# Patient Record
Sex: FEMALE | Race: BLACK OR AFRICAN AMERICAN | Employment: OTHER | URBAN - METROPOLITAN AREA
[De-identification: names, ages, dates, MRNs, and addresses within clinical notes are randomized per-mention and may not be internally consistent; named-entity substitution may affect disease eponyms.]

---

## 2021-03-11 ENCOUNTER — TRANSCRIBE ORDER (OUTPATIENT)
Dept: SCHEDULING | Age: 86
End: 2021-03-11

## 2021-03-11 DIAGNOSIS — R09.89 LEFT CAROTID BRUIT: Primary | ICD-10-CM

## 2021-03-11 DIAGNOSIS — I42.0 CONGESTIVE CARDIOMYOPATHY (HCC): Primary | ICD-10-CM

## 2021-03-18 ENCOUNTER — HOSPITAL ENCOUNTER (OUTPATIENT)
Dept: NON INVASIVE DIAGNOSTICS | Age: 86
Discharge: HOME | End: 2021-03-18
Attending: INTERNAL MEDICINE
Payer: MEDICARE

## 2021-03-18 ENCOUNTER — HOSPITAL ENCOUNTER (OUTPATIENT)
Dept: VASCULAR SURGERY | Age: 86
Discharge: HOME | End: 2021-03-18
Attending: INTERNAL MEDICINE
Payer: MEDICARE

## 2021-03-18 VITALS
DIASTOLIC BLOOD PRESSURE: 72 MMHG | SYSTOLIC BLOOD PRESSURE: 162 MMHG | HEIGHT: 66 IN | WEIGHT: 147 LBS | BODY MASS INDEX: 23.63 KG/M2

## 2021-03-18 DIAGNOSIS — R09.89 LEFT CAROTID BRUIT: ICD-10-CM

## 2021-03-18 DIAGNOSIS — I42.0 CONGESTIVE CARDIOMYOPATHY (HCC): ICD-10-CM

## 2021-03-18 LAB
ECHO AO ROOT DIAM: 2.5 CM
ECHO AV AREA PEAK VELOCITY: 2.73 CM2
ECHO AV AREA PLAN/BSA: 1.32
ECHO AV AREA VTI: 2.31 CM2
ECHO AV AREA/BSA PEAK VELOCITY: 1.6 CM2/M2
ECHO AV AREA/BSA VTI: 1.3 CM2/M2
ECHO AV CUSP MM: 1.4 CM
ECHO AV MEAN GRADIENT: 5 MMHG
ECHO AV MEAN VELOCITY: 106 CM/S
ECHO AV PEAK GRADIENT: 8 MMHG
ECHO AV VTI: 33.5 CM
ECHO EST RA PRESSURE: 3 MMHG
ECHO LA AREA 2C: 11.8 CM2
ECHO LA AREA 4C: 13 CM2
ECHO LA MAJOR AXIS: 3.5 CM
ECHO LA MAJOR AXIS: 4.48 CM
ECHO LA TO AORTIC ROOT RATIO: 1.4
ECHO LV E' LATERAL VELOCITY: 6.42 CM/S
ECHO LV E' SEPTAL VELOCITY: 5.98 CM/S
ECHO LV EDV A2C: 74.1 CM3
ECHO LV EJECTION FRACTION A2C: 36 %
ECHO LV EJECTION FRACTION BIPLANE: 65.6 % (ref 55–100)
ECHO LV ESV A2C: 19.7 CM3
ECHO LV INTERNAL DIMENSION DIASTOLIC: 4.2 CM (ref 3.9–5.3)
ECHO LV INTERNAL DIMENSION SYSTOLIC: 2.7 CM
ECHO LV IVSD: 0.8 CM (ref 0.6–0.9)
ECHO LV MASS 2D: 101.3 G (ref 67–162)
ECHO LV MASS INDEX 2D: 57.7 G/M2 (ref 43–95)
ECHO LV POSTERIOR WALL DIASTOLIC: 0.8 CM (ref 0.6–0.9)
ECHO LVOT DIAM: 2 CM
ECHO LVOT PEAK GRADIENT: 6 MMHG
ECHO LVOT SV: 77 CM3
ECHO LVOT VTI: 21.8 CM
ECHO LVOT VTI: 24.6 CM
ECHO LVOT VTI: 25.8 CM
ECHO LVOT VTI: 26.1 CM
ECHO MV A VELOCITY: 101 CM/S
ECHO MV AREA PHT: 3.1 CM2
ECHO MV E DECELERATION TIME (DT): 243 MS
ECHO MV E VELOCITY: 84.9 CM/S
ECHO MV E/A RATIO: 0.84
ECHO MV E/E' LATERAL: 13.22
ECHO MV E/E' RATIO (AVERAGED): 13.71
ECHO MV E/E' SEPTAL: 14.2
ECHO MV PRESSURE HALF TIME (PHT): 71 MS
ECHO PV REGURGITANT MAX VELOCITY: 121 CM/S
ECHO PV REGURGITANT MAX VELOCITY: 139 CM/S
ECHO PV REGURGITANT MAX VELOCITY: 207 CM/S
ECHO RA AREA 4C: 10.9 CM2
ECHO RA MAJOR AXIS: 4.83 CM
ECHO RIGHT VENTRICULAR SYSTOLIC PRESSURE (RVSP): 20 MMHG
ECHO RV INTERNAL DIMENSION: 2.4 CM
ECHO RV TAPSE: 1.39 CM (ref 1.5–2)
ECHO TV MEAN GRADIENT: 17 MMHG
LA VOL DISK BP: 30.5 CM3 (ref 22–52)
LEFT CCA DIST DIAS: 17.4 CM/S
LEFT CCA DIST SYS: 87 CM/S
LEFT CCA MID DIAS: 16.8 CM/S
LEFT CCA MID SYS: 87 CM/S
LEFT CCA PROX DIAS: 18 CM/S
LEFT CCA PROX SYS: 103 CM/S
LEFT ECA SYS: 68 CM/S
LEFT ICA DIST DIAS: 25.9 CM/S
LEFT ICA DIST SYS: 96.7 CM/S
LEFT ICA MID DIAS: 24.5 CM/S
LEFT ICA MID SYS: 117 CM/S
LEFT ICA PROX DIAS: 21.1 CM/S
LEFT ICA PROX SYS: 74.6 CM/S
LEFT ICA/CCA SYS: 1.34
LEFT SUBCLAVIAN SYS: 103 CM/S
LEFT VERTEBRAL DIAS: 12.8 CM/S
LEFT VERTEBRAL SYS: 50 CM/S
LVOT MG: 3 MMHG
MV DEC SLOPE: 3490 MM/S2
MV DEC SLOPE: 3490 MM/S2
RIGHT CCA DIST DIAS: 17.4 CM/S
RIGHT CCA DIST SYS: 64 CM/S
RIGHT CCA MID DIAS: 14.9 CM/S
RIGHT CCA MID SYS: 68.3 CM/S
RIGHT CCA PROX DIAS: 11.8 CM/S
RIGHT CCA PROX SYS: 115 CM/S
RIGHT ECA SYS: 77.8 CM/S
RIGHT ICA DIST DIAS: 18.8 CM/S
RIGHT ICA DIST SYS: 90.2 CM/S
RIGHT ICA MID DIAS: 21.7 CM/S
RIGHT ICA MID SYS: 114 CM/S
RIGHT ICA PROX DIAS: 16.2 CM/S
RIGHT ICA PROX SYS: 65.9 CM/S
RIGHT ICA/CCA SYS: 1.78
RIGHT SUBCLAVIAN SYS: 110 CM/S
RIGHT VERTEBRAL DIAS: 11.3 CM/S
RIGHT VERTEBRAL SYS: 53.6 CM/S

## 2021-03-18 PROCEDURE — 93880 EXTRACRANIAL BILAT STUDY: CPT

## 2021-03-18 PROCEDURE — 93306 TTE W/DOPPLER COMPLETE: CPT

## 2021-06-14 ENCOUNTER — TRANSCRIBE ORDER (OUTPATIENT)
Dept: SCHEDULING | Age: 86
End: 2021-06-14

## 2021-06-14 DIAGNOSIS — E07.9 THYROID MASS: Primary | ICD-10-CM

## 2021-06-15 ENCOUNTER — OFFICE VISIT (OUTPATIENT)
Dept: CARDIOLOGY CLINIC | Age: 86
End: 2021-06-15
Payer: MEDICARE

## 2021-06-15 VITALS
SYSTOLIC BLOOD PRESSURE: 138 MMHG | OXYGEN SATURATION: 99 % | HEART RATE: 87 BPM | BODY MASS INDEX: 23.3 KG/M2 | HEIGHT: 66 IN | WEIGHT: 145 LBS | DIASTOLIC BLOOD PRESSURE: 90 MMHG | RESPIRATION RATE: 16 BRPM

## 2021-06-15 DIAGNOSIS — R06.02 SOB (SHORTNESS OF BREATH): ICD-10-CM

## 2021-06-15 DIAGNOSIS — I42.0 CONGESTIVE CARDIOMYOPATHY (HCC): Primary | ICD-10-CM

## 2021-06-15 PROCEDURE — G8510 SCR DEP NEG, NO PLAN REQD: HCPCS | Performed by: SPECIALIST

## 2021-06-15 PROCEDURE — G8427 DOCREV CUR MEDS BY ELIG CLIN: HCPCS | Performed by: SPECIALIST

## 2021-06-15 PROCEDURE — 1090F PRES/ABSN URINE INCON ASSESS: CPT | Performed by: SPECIALIST

## 2021-06-15 PROCEDURE — 93000 ELECTROCARDIOGRAM COMPLETE: CPT | Performed by: SPECIALIST

## 2021-06-15 PROCEDURE — G8400 PT W/DXA NO RESULTS DOC: HCPCS | Performed by: SPECIALIST

## 2021-06-15 PROCEDURE — G8536 NO DOC ELDER MAL SCRN: HCPCS | Performed by: SPECIALIST

## 2021-06-15 PROCEDURE — 99204 OFFICE O/P NEW MOD 45 MIN: CPT | Performed by: SPECIALIST

## 2021-06-15 PROCEDURE — 1101F PT FALLS ASSESS-DOCD LE1/YR: CPT | Performed by: SPECIALIST

## 2021-06-15 PROCEDURE — G8420 CALC BMI NORM PARAMETERS: HCPCS | Performed by: SPECIALIST

## 2021-06-15 RX ORDER — METFORMIN HYDROCHLORIDE 1000 MG/1
TABLET ORAL
COMMUNITY
Start: 2021-03-11

## 2021-06-15 RX ORDER — ASPIRIN 81 MG/1
TABLET ORAL
COMMUNITY
Start: 2021-03-31

## 2021-06-15 RX ORDER — SITAGLIPTIN 100 MG/1
TABLET, FILM COATED ORAL
COMMUNITY
Start: 2021-03-29

## 2021-06-15 RX ORDER — LOSARTAN POTASSIUM 25 MG/1
TABLET ORAL
COMMUNITY
Start: 2021-03-29

## 2021-06-15 RX ORDER — ATORVASTATIN CALCIUM 80 MG/1
TABLET, FILM COATED ORAL
COMMUNITY
Start: 2021-03-26

## 2021-06-15 RX ORDER — BLOOD SUGAR DIAGNOSTIC
STRIP MISCELLANEOUS
COMMUNITY
Start: 2021-06-12

## 2021-06-15 RX ORDER — LATANOPROST 50 UG/ML
SOLUTION/ DROPS OPHTHALMIC
COMMUNITY
Start: 2021-04-12

## 2021-06-15 NOTE — PROGRESS NOTES
Visit Vitals  Pulse 87   Resp 16   Ht 5' 6\" (1.676 m)   Wt 145 lb (65.8 kg)   SpO2 99%   BMI 23.40 kg/m²

## 2021-06-15 NOTE — PATIENT INSTRUCTIONS
You will be scheduled for a lexiscan nuclear stress test and a follow up with Dr. Ivon Padilla (same day)    Wear comfortable clothing (shorts or pants with a shirt or blouse- no underwire bras) and walking or athletic shoes. Do not eat or drink anything, except water, for at least 2 hours prior to your appointment. void tobacco products for at least 6 hours prior to your test.    Do not eat or drink anything containing caffeine, including but not limited to the following: chocolate, regular and decaffeinated coffee, soft drinks, or tea for at least 24 hours prior to your test.    Do not  hold your scheduled medications prior to your test. If you are a diabetic, please ask your physician how to adjust your food and insulin prior to your test. Please bring all medications you are currently taking. Your test will be performed on a 1 day protocol. This is determined by your height, weight, and other risk factors. For a 1 day test, please allow for 4 hours in the office that day. The radioactive isotope used for your testing is different from any of the dyes that are commonly used in x-ray procedures, and is ordered specially for your test. Please call to cancel or reschedule your appointment at least 24 hours prior to your scheduled appointment to avoid being billed for the expensive isotope. Patient given green instruction sheet for nuclear stress test at office visit. Informed patient of instructions, date, time, location and asked that they bring signed instruction sheet to appointment.

## 2021-06-15 NOTE — PROGRESS NOTES
[facility]                                                            OFFICE NOTE        Cynthia Jolley M.D.,LETY            Sudha BECKETT   1935  [id number]    Date/Time:  6/15/786662:01 PM        ICD-10-CM ICD-9-CM    1. Congestive cardiomyopathy (HCC)  I42.0 425.4 AMB POC EKG ROUTINE W/ 12 LEADS, INTER & REP         SUBJECTIVE:    In the last year she has noticed more sob with activities   no cp reported     Assessment/Plan    1. Shortness of breath: Her electrocardiogram today reveals a normal sinus rhythm without significant ST-T wave abnormalities. We have discussed different options. Proceed with nuclear stress test.    2.  Hypertension: Slightly hypertensive upon arrival blood pressure improved after few minutes to 130/90 mmHg. For now no changes in losartan and the blood pressure will be rechecked at the next office visit. To be noted the patient has had a echocardiogram in March 2021 which is revealed a normal ejection fraction and no major valvular dysfunction. 3.  Hyperlipidemia: Continue with Lipitor closely followed by her primary care physician. 4.  Diabetes: Also closely followed by primary care physician. 5.  Carotid artery stenosis: Less than 50% stenosis bilaterally by carotid salt March 2021.    6.  Thyroid nodule: As per primary care physician. Otherwise see her back after the nuclear stress test          HPI   Very pleasant 81years old lady with a past medical history remarkable for hypertension, hyperlipidemia, type 2 diabetes who presents today for evaluation of shortness of breath. To be noted the patient tells me that she has undergone PCI of unknown vessel at [facility] in [address] [place] [address]. She denies any previous history of myocardial infarction. Past medical history: As above. Past surgical history: PCI of unknown vessel.     Social history: She does not drink or smoke. Family history: Noncontributory. CARDIAC STUDIES        03/18/21    ECHO ADULT COMPLETE 03/18/2021 3/18/2021    Interpretation Summary  · LV: Calculated LVEF is 66%. Normal cavity size and systolic function (ejection fraction normal). Mild concentric hypertrophy. Mild (grade 1) left ventricular diastolic dysfunction. · RA: Small right atrium. · MV: Mitral valve non-specific thickening. Possible trace MS. Frames 41, 42.  · TV: Mild tricuspid valve regurgitation is present. Right Ventricular Arterial Pressure (RVSP) is 20 mmHg. Pulmonary hypertension not suggested by Doppler findings. · PA: Pulmonary arterial systolic pressure is 20 mmHg. · RV: Borderline low systolic function. · IAS: No atrial septal defect present. Signed by: Walt Cruz MD on 3/18/2021 12:59 PM                              EKG Results     Procedure 720 Value Units Date/Time    AMB POC EKG ROUTINE W/ 12 LEADS, INTER & REP [[id number]] Resulted: 06/15/21 1126    Order Status: Completed Updated: 06/15/21 1131              IMAGING      MRI Results (most recent):  No results found for this or any previous visit. CT Results (most recent):  No results found for this or any previous visit. XR Results (most recent):  No results found for this or any previous visit. No past medical history on file. No past surgical history on file. Social History     Tobacco Use    Smoking status: Never Smoker    Smokeless tobacco: Never Used   Substance Use Topics    Alcohol use: Not on file    Drug use: Not on file     No family history on file. No Known Allergies      Visit Vitals  Pulse 87   Resp 16   Ht 5' 6\" (1.676 m)   Wt 145 lb (65.8 kg)   SpO2 99%   BMI 23.40 kg/m²         Last 3 Recorded Weights in this Encounter    06/15/21 1120   Weight: 145 lb (65.8 kg)            Review of Systems:   Pertinent items are noted in the History of Present Illness.        Neck: no JVD  Heart: regular rate and rhythm  Lungs: clear to auscultation bilaterally  Abdomen: soft, non-tender. Bowel sounds normal. No masses,  no organomegaly  Extremities: no edema      Current Outpatient Medications on File Prior to Visit   Medication Sig Dispense Refill    aspirin delayed-release 81 mg tablet TAKE 1 TABLET BY MOUTH DAILY      atorvastatin (LIPITOR) 80 mg tablet TAKE 1 TABLET BY MOUTH EVERY DAY      Accu-Chek SmartView Test Strip strip       latanoprost (XALATAN) 0.005 % ophthalmic solution INSTILL 1 DROP INTO BOTH EYES AT BEDTIME      losartan (COZAAR) 25 mg tablet TAKE 1 TABLET BY MOUTH EVERY DAY      metFORMIN (GLUCOPHAGE) 1,000 mg tablet TAKE 1 TABLET (1,000 MG) BY ORAL ROUTE 2 TIMES PER DAY WITH MORNING AND EVENING MEALS FOR DIABETES.  Januvia 100 mg tablet TAKE 1 TABLET BY MOUTH EVERY DAY FOR DIABETES       No current facility-administered medications on file prior to visit. Jovanna LOYDDiane Karli Li had no medications administered during this visit. Current Outpatient Medications   Medication Sig    aspirin delayed-release 81 mg tablet TAKE 1 TABLET BY MOUTH DAILY    atorvastatin (LIPITOR) 80 mg tablet TAKE 1 TABLET BY MOUTH EVERY DAY    Accu-Chek SmartView Test Strip strip     latanoprost (XALATAN) 0.005 % ophthalmic solution INSTILL 1 DROP INTO BOTH EYES AT BEDTIME    losartan (COZAAR) 25 mg tablet TAKE 1 TABLET BY MOUTH EVERY DAY    metFORMIN (GLUCOPHAGE) 1,000 mg tablet TAKE 1 TABLET (1,000 MG) BY ORAL ROUTE 2 TIMES PER DAY WITH MORNING AND EVENING MEALS FOR DIABETES.  Januvia 100 mg tablet TAKE 1 TABLET BY MOUTH EVERY DAY FOR DIABETES     No current facility-administered medications for this visit.          No results found for: CHOL, CHOLPOCT, CHOLX, CHLST, CHOLV, HDL, HDLPOC, HDLP, LDL, LDLCPOC, LDLC, DLDLP, VLDLC, VLDL, TGLX, TRIGL, TRIGP, TGLPOCT, CHHD, CHHDX    No results found for: NA, K, CL, CO2, AGAP, GLU, BUN, CREA, BUCR, GFRAA, GFRNA, CA, GFRAA    No results found for: ALT, GGT, GGTP, AP, APIT, APX, CBIL, TBIL, TBILI    No results found for: WBC, WBCLT, HGBPOC, HGB, HGBP, HCTPOC, HCT, PHCT, RBCH, PLT, MCV, HGBEXT, HCTEXT, PLTEXT    No results found for: TSH, TSH2, TSH3, TSHP, TSHEXT      No results found for: CHOL, CHOLPOCT, CHOLX, CHLST, CHOLV, HDL, HDLP, LDL, LDLC, DLDLP, TGLX, TRIGL, TRIGP, TGLPOCT, NTGLT, CHHD, CHHDX             Please note that this dictation was completed with [employer], the [employer] voice recognition software. Quite often unanticipated grammatical, syntax, homophones, and other interpretative errors are inadvertently transcribed by the computer software. Please disregard these errors. Please excuse any errors that have escaped final proofreading.

## 2021-07-29 ENCOUNTER — TELEPHONE (OUTPATIENT)
Dept: CARDIOLOGY CLINIC | Age: 86
End: 2021-07-29

## 2021-07-29 NOTE — TELEPHONE ENCOUNTER
Called and left a message with patients daughter regarding upcoming nuclear and appointment with Dr. Rudy Eagle. Requested to move Nuclear up to 8 am and Dr. Rudy Eagle up to 11:40(double book per Luís Carlin) same day. Patients daughter stated that she will have to see if they can make arrangements to have get the patient here that early and will call back. Thanks.

## 2022-07-11 ENCOUNTER — TRANSCRIBE ORDER (OUTPATIENT)
Dept: SCHEDULING | Age: 87
End: 2022-07-11

## 2022-07-11 DIAGNOSIS — R06.09 DOE (DYSPNEA ON EXERTION): ICD-10-CM

## 2022-07-11 DIAGNOSIS — R07.9 CHEST PAIN: Primary | ICD-10-CM

## 2022-07-12 ENCOUNTER — TRANSCRIBE ORDER (OUTPATIENT)
Dept: SCHEDULING | Age: 87
End: 2022-07-12

## 2022-07-12 DIAGNOSIS — R00.2 PALPITATIONS: Primary | ICD-10-CM

## 2022-08-03 ENCOUNTER — HOSPITAL ENCOUNTER (OUTPATIENT)
Dept: NUCLEAR MEDICINE | Age: 87
Discharge: HOME | End: 2022-08-03
Attending: INTERNAL MEDICINE
Payer: MEDICARE

## 2022-08-03 ENCOUNTER — HOSPITAL ENCOUNTER (OUTPATIENT)
Dept: NON INVASIVE DIAGNOSTICS | Age: 87
Discharge: HOME | End: 2022-08-03
Attending: INTERNAL MEDICINE
Payer: MEDICARE

## 2022-08-03 VITALS
WEIGHT: 140 LBS | HEIGHT: 67 IN | SYSTOLIC BLOOD PRESSURE: 186 MMHG | DIASTOLIC BLOOD PRESSURE: 88 MMHG | BODY MASS INDEX: 21.97 KG/M2

## 2022-08-03 VITALS
BODY MASS INDEX: 21.97 KG/M2 | SYSTOLIC BLOOD PRESSURE: 186 MMHG | DIASTOLIC BLOOD PRESSURE: 88 MMHG | HEIGHT: 67 IN | WEIGHT: 140 LBS

## 2022-08-03 DIAGNOSIS — R00.2 PALPITATIONS: ICD-10-CM

## 2022-08-03 DIAGNOSIS — R06.09 DOE (DYSPNEA ON EXERTION): ICD-10-CM

## 2022-08-03 DIAGNOSIS — R07.9 CHEST PAIN: ICD-10-CM

## 2022-08-03 LAB
ECHO AO ASC DIAM: 2.7 CM
ECHO AO ASCENDING AORTA INDEX: 1.55 CM/M2
ECHO AO ROOT DIAM: 2.7 CM
ECHO AO ROOT INDEX: 1.55 CM/M2
ECHO AV AREA PEAK VELOCITY: 2.5 CM2
ECHO AV AREA VTI: 2.5 CM2
ECHO AV AREA/BSA PEAK VELOCITY: 1.4 CM2/M2
ECHO AV AREA/BSA VTI: 1.4 CM2/M2
ECHO AV MEAN GRADIENT: 4 MMHG
ECHO AV MEAN VELOCITY: 1 M/S
ECHO AV PEAK GRADIENT: 7 MMHG
ECHO AV PEAK VELOCITY: 1.4 M/S
ECHO AV VELOCITY RATIO: 0.71
ECHO AV VTI: 30.3 CM
ECHO EST RA PRESSURE: 3 MMHG
ECHO LA DIAMETER INDEX: 1.9 CM/M2
ECHO LA DIAMETER: 3.3 CM
ECHO LA TO AORTIC ROOT RATIO: 1.22
ECHO LA VOL 2C: 45 ML (ref 22–52)
ECHO LA VOL 4C: 28 ML (ref 22–52)
ECHO LA VOL BP: 37 ML (ref 22–52)
ECHO LA VOL/BSA BIPLANE: 21 ML/M2 (ref 16–34)
ECHO LA VOLUME AREA LENGTH: 39 ML
ECHO LA VOLUME INDEX A2C: 26 ML/M2 (ref 16–34)
ECHO LA VOLUME INDEX A4C: 16 ML/M2 (ref 16–34)
ECHO LA VOLUME INDEX AREA LENGTH: 22 ML/M2 (ref 16–34)
ECHO LV E' LATERAL VELOCITY: 8 CM/S
ECHO LV E' SEPTAL VELOCITY: 8 CM/S
ECHO LV EDV A2C: 59 ML
ECHO LV EDV A4C: 61 ML
ECHO LV EDV BP: 61 ML (ref 56–104)
ECHO LV EDV INDEX A4C: 35 ML/M2
ECHO LV EDV INDEX BP: 35 ML/M2
ECHO LV EDV NDEX A2C: 34 ML/M2
ECHO LV EJECTION FRACTION A2C: 68 %
ECHO LV EJECTION FRACTION A4C: 72 %
ECHO LV EJECTION FRACTION BIPLANE: 70 % (ref 55–100)
ECHO LV ESV A2C: 19 ML
ECHO LV ESV A4C: 17 ML
ECHO LV ESV BP: 18 ML (ref 19–49)
ECHO LV ESV INDEX A2C: 11 ML/M2
ECHO LV ESV INDEX A4C: 10 ML/M2
ECHO LV ESV INDEX BP: 10 ML/M2
ECHO LV FRACTIONAL SHORTENING: 33 % (ref 28–44)
ECHO LV INTERNAL DIMENSION DIASTOLE INDEX: 2.3 CM/M2
ECHO LV INTERNAL DIMENSION DIASTOLIC: 4 CM (ref 3.9–5.3)
ECHO LV INTERNAL DIMENSION SYSTOLIC INDEX: 1.55 CM/M2
ECHO LV INTERNAL DIMENSION SYSTOLIC: 2.7 CM
ECHO LV IVSD: 0.9 CM (ref 0.6–0.9)
ECHO LV MASS 2D: 109.7 G (ref 67–162)
ECHO LV MASS INDEX 2D: 63 G/M2 (ref 43–95)
ECHO LV POSTERIOR WALL DIASTOLIC: 0.9 CM (ref 0.6–0.9)
ECHO LV RELATIVE WALL THICKNESS RATIO: 0.45
ECHO LVOT AREA: 3.5 CM2
ECHO LVOT AV VTI INDEX: 0.71
ECHO LVOT DIAM: 2.1 CM
ECHO LVOT MEAN GRADIENT: 2 MMHG
ECHO LVOT PEAK GRADIENT: 4 MMHG
ECHO LVOT PEAK VELOCITY: 1 M/S
ECHO LVOT STROKE VOLUME INDEX: 42.6 ML/M2
ECHO LVOT SV: 74.1 ML
ECHO LVOT VTI: 21.4 CM
ECHO MV A VELOCITY: 1.12 M/S
ECHO MV AREA VTI: 2.8 CM2
ECHO MV E DECELERATION TIME (DT): 324 MS
ECHO MV E VELOCITY: 0.85 M/S
ECHO MV E/A RATIO: 0.76
ECHO MV E/E' LATERAL: 10.63
ECHO MV E/E' RATIO (AVERAGED): 10.63
ECHO MV E/E' SEPTAL: 10.63
ECHO MV LVOT VTI INDEX: 1.24
ECHO MV MAX VELOCITY: 1.3 M/S
ECHO MV MEAN GRADIENT: 3 MMHG
ECHO MV MEAN VELOCITY: 0.8 M/S
ECHO MV PEAK GRADIENT: 6 MMHG
ECHO MV VTI: 26.5 CM
ECHO PV MAX VELOCITY: 1 M/S
ECHO PV PEAK GRADIENT: 4 MMHG
ECHO RIGHT VENTRICULAR SYSTOLIC PRESSURE (RVSP): 23 MMHG
ECHO RV INTERNAL DIMENSION: 2.3 CM
ECHO RV TAPSE: 1.9 CM (ref 1.7–?)
ECHO TV REGURGITANT MAX VELOCITY: 2.22 M/S
ECHO TV REGURGITANT PEAK GRADIENT: 20 MMHG
NUC STRESS EJECTION FRACTION: 60 %
STRESS BASELINE HR: 95 BPM
STRESS BASELINE ST DEPRESSION: 0 MM
STRESS ESTIMATED WORKLOAD: 1 METS
STRESS EXERCISE DUR MIN: 4 MIN
STRESS EXERCISE DUR SEC: 0 SEC
STRESS PEAK DIAS BP: 86 MMHG
STRESS PEAK SYS BP: 187 MMHG
STRESS PERCENT HR ACHIEVED: 90 %
STRESS POST PEAK HR: 121 BPM
STRESS RATE PRESSURE PRODUCT: NORMAL BPM*MMHG
STRESS ST DEPRESSION: 1.2 MM
STRESS TARGET HR: 135 BPM

## 2022-08-03 PROCEDURE — 93017 CV STRESS TEST TRACING ONLY: CPT

## 2022-08-03 PROCEDURE — 93306 TTE W/DOPPLER COMPLETE: CPT

## 2022-08-03 PROCEDURE — 74011250636 HC RX REV CODE- 250/636: Performed by: INTERNAL MEDICINE

## 2022-08-03 PROCEDURE — A9500 TC99M SESTAMIBI: HCPCS

## 2022-08-03 PROCEDURE — 93226 XTRNL ECG REC<48 HR SCAN A/R: CPT

## 2022-08-03 RX ORDER — TETRAKIS(2-METHOXYISOBUTYLISOCYANIDE)COPPER(I) TETRAFLUOROBORATE 1 MG/ML
9.4 INJECTION, POWDER, LYOPHILIZED, FOR SOLUTION INTRAVENOUS
Status: COMPLETED | OUTPATIENT
Start: 2022-08-03 | End: 2022-08-03

## 2022-08-03 RX ORDER — TETRAKIS(2-METHOXYISOBUTYLISOCYANIDE)COPPER(I) TETRAFLUOROBORATE 1 MG/ML
33.4 INJECTION, POWDER, LYOPHILIZED, FOR SOLUTION INTRAVENOUS
Status: COMPLETED | OUTPATIENT
Start: 2022-08-03 | End: 2022-08-03

## 2022-08-03 RX ADMIN — REGADENOSON 0.4 MG: 0.08 INJECTION, SOLUTION INTRAVENOUS at 09:02

## 2022-08-03 RX ADMIN — TETRAKIS(2-METHOXYISOBUTYLISOCYANIDE)COPPER(I) TETRAFLUOROBORATE 33.4 MILLICURIE: 1 INJECTION, POWDER, LYOPHILIZED, FOR SOLUTION INTRAVENOUS at 09:05

## 2022-08-03 RX ADMIN — TETRAKIS(2-METHOXYISOBUTYLISOCYANIDE)COPPER(I) TETRAFLUOROBORATE 9.4 MILLICURIE: 1 INJECTION, POWDER, LYOPHILIZED, FOR SOLUTION INTRAVENOUS at 08:00

## 2022-09-21 ENCOUNTER — HOSPITAL ENCOUNTER (EMERGENCY)
Age: 87
Discharge: LEFT BEFORE DISCHARGE | End: 2022-09-21
Payer: MEDICARE

## 2022-09-21 PROCEDURE — 75810000275 HC EMERGENCY DEPT VISIT NO LEVEL OF CARE
